# Patient Record
Sex: FEMALE | Race: WHITE | Employment: UNEMPLOYED | URBAN - METROPOLITAN AREA
[De-identification: names, ages, dates, MRNs, and addresses within clinical notes are randomized per-mention and may not be internally consistent; named-entity substitution may affect disease eponyms.]

---

## 2022-03-19 PROBLEM — G93.41 ACUTE METABOLIC ENCEPHALOPATHY: Status: ACTIVE | Noted: 2017-04-08

## 2022-03-19 PROBLEM — E46 MALNUTRITION (HCC): Status: ACTIVE | Noted: 2017-04-08

## 2022-03-19 PROBLEM — R94.31 PROLONGED Q-T INTERVAL ON ECG: Status: ACTIVE | Noted: 2017-04-08

## 2022-03-20 PROBLEM — E83.52 HYPERCALCEMIA: Status: ACTIVE | Noted: 2017-04-08

## 2022-03-20 PROBLEM — E87.6 HYPOKALEMIA: Status: ACTIVE | Noted: 2017-04-08

## 2023-10-03 PROBLEM — J44.9 CHRONIC OBSTRUCTIVE LUNG DISEASE (HCC): Status: ACTIVE | Noted: 2017-04-08

## 2023-10-03 PROBLEM — F32.A DEPRESSIVE DISORDER: Status: ACTIVE | Noted: 2017-04-08

## 2023-10-03 PROBLEM — E87.6 HYPOKALEMIA: Status: RESOLVED | Noted: 2017-04-08 | Resolved: 2023-10-03

## 2023-10-03 PROBLEM — G93.41 ACUTE METABOLIC ENCEPHALOPATHY: Status: RESOLVED | Noted: 2017-04-08 | Resolved: 2023-10-03

## 2023-10-03 PROBLEM — R59.0 MEDIASTINAL LYMPHADENOPATHY: Status: ACTIVE | Noted: 2017-04-01

## 2023-10-03 PROBLEM — E83.52 HYPERCALCEMIA: Status: RESOLVED | Noted: 2017-04-08 | Resolved: 2023-10-03

## 2023-10-03 PROBLEM — I10 ESSENTIAL HYPERTENSION: Status: ACTIVE | Noted: 2017-04-08

## 2023-10-04 PROBLEM — I16.0 HYPERTENSIVE URGENCY: Status: ACTIVE | Noted: 2023-10-04

## 2023-10-04 PROBLEM — F23 ACUTE PSYCHOSIS (HCC): Status: ACTIVE | Noted: 2023-10-04

## 2023-10-04 PROBLEM — F22 DELUSIONS (HCC): Status: ACTIVE | Noted: 2023-10-04

## 2023-10-04 PROBLEM — R45.1 ANXIETY WITH AGITATION: Status: ACTIVE | Noted: 2023-10-04

## 2023-10-04 PROBLEM — R45.851 SUICIDAL IDEATION: Status: ACTIVE | Noted: 2023-10-04

## 2023-10-04 PROBLEM — F41.9 ANXIETY WITH AGITATION: Status: ACTIVE | Noted: 2023-10-04

## 2023-10-04 PROBLEM — N18.32 STAGE 3B CHRONIC KIDNEY DISEASE (HCC): Status: ACTIVE | Noted: 2023-10-04

## 2023-10-04 PROBLEM — Z91.89 POTENTIAL FOR COGNITIVE IMPAIRMENT: Status: ACTIVE | Noted: 2023-10-04

## 2023-10-04 PROBLEM — F22 PARANOIA (HCC): Status: ACTIVE | Noted: 2023-10-04

## 2023-10-04 PROBLEM — R45.850 HOMICIDAL IDEATION: Status: ACTIVE | Noted: 2023-10-04

## 2023-10-04 PROBLEM — R41.0 CONFUSION: Status: ACTIVE | Noted: 2023-10-04

## 2024-10-02 ENCOUNTER — HOSPITAL ENCOUNTER (EMERGENCY)
Facility: HOSPITAL | Age: 74
Discharge: NURSING, REHAB OR LONG-TERM CARE | End: 2024-10-07
Attending: STUDENT IN AN ORGANIZED HEALTH CARE EDUCATION/TRAINING PROGRAM
Payer: MEDICARE

## 2024-10-02 DIAGNOSIS — N30.00 ACUTE CYSTITIS WITHOUT HEMATURIA: ICD-10-CM

## 2024-10-02 DIAGNOSIS — R45.851 SUICIDAL IDEATION: Primary | ICD-10-CM

## 2024-10-02 LAB
ALBUMIN SERPL-MCNC: 3.5 G/DL (ref 3.5–5)
ALBUMIN/GLOB SERPL: 0.9 (ref 1.1–2.2)
ALP SERPL-CCNC: 112 U/L (ref 45–117)
ALT SERPL-CCNC: 18 U/L (ref 12–78)
AMPHET UR QL SCN: NEGATIVE
ANION GAP SERPL CALC-SCNC: 10 MMOL/L (ref 2–12)
APPEARANCE UR: CLEAR
AST SERPL W P-5'-P-CCNC: 18 U/L (ref 15–37)
BACTERIA URNS QL MICRO: ABNORMAL /HPF
BARBITURATES UR QL SCN: NEGATIVE
BASOPHILS # BLD: 0.1 K/UL (ref 0–0.1)
BASOPHILS NFR BLD: 1 % (ref 0–1)
BENZODIAZ UR QL: NEGATIVE
BILIRUB SERPL-MCNC: 0.2 MG/DL (ref 0.2–1)
BILIRUB UR QL: NEGATIVE
BUN SERPL-MCNC: 52 MG/DL (ref 6–20)
BUN/CREAT SERPL: 23 (ref 12–20)
CA-I BLD-MCNC: 9.4 MG/DL (ref 8.5–10.1)
CANNABINOIDS UR QL SCN: NEGATIVE
CHLORIDE SERPL-SCNC: 102 MMOL/L (ref 97–108)
CO2 SERPL-SCNC: 25 MMOL/L (ref 21–32)
COCAINE UR QL SCN: NEGATIVE
COLOR UR: ABNORMAL
CREAT SERPL-MCNC: 2.26 MG/DL (ref 0.55–1.02)
DIFFERENTIAL METHOD BLD: ABNORMAL
EOSINOPHIL # BLD: 0.1 K/UL (ref 0–0.4)
EOSINOPHIL NFR BLD: 2 % (ref 0–7)
EPITH CASTS URNS QL MICRO: ABNORMAL /LPF
ERYTHROCYTE [DISTWIDTH] IN BLOOD BY AUTOMATED COUNT: 13.9 % (ref 11.5–14.5)
ETHANOL SERPL-MCNC: <10 MG/DL (ref 0–0.08)
GLOBULIN SER CALC-MCNC: 4 G/DL (ref 2–4)
GLUCOSE SERPL-MCNC: 121 MG/DL (ref 65–100)
GLUCOSE UR STRIP.AUTO-MCNC: NEGATIVE MG/DL
HCT VFR BLD AUTO: 37.9 % (ref 35–47)
HGB BLD-MCNC: 12.5 G/DL (ref 11.5–16)
HGB UR QL STRIP: NEGATIVE
IMM GRANULOCYTES # BLD AUTO: 0 K/UL (ref 0–0.04)
IMM GRANULOCYTES NFR BLD AUTO: 1 % (ref 0–0.5)
KETONES UR QL STRIP.AUTO: NEGATIVE MG/DL
LEUKOCYTE ESTERASE UR QL STRIP.AUTO: ABNORMAL
LYMPHOCYTES # BLD: 2 K/UL (ref 0.8–3.5)
LYMPHOCYTES NFR BLD: 32 % (ref 12–49)
Lab: NORMAL
MCH RBC QN AUTO: 31.4 PG (ref 26–34)
MCHC RBC AUTO-ENTMCNC: 33 G/DL (ref 30–36.5)
MCV RBC AUTO: 95.2 FL (ref 80–99)
METHADONE UR QL: NEGATIVE
MONOCYTES # BLD: 0.7 K/UL (ref 0–1)
MONOCYTES NFR BLD: 12 % (ref 5–13)
NEUTS SEG # BLD: 3.2 K/UL (ref 1.8–8)
NEUTS SEG NFR BLD: 52 % (ref 32–75)
NITRITE UR QL STRIP.AUTO: NEGATIVE
NRBC # BLD: 0 K/UL (ref 0–0.01)
NRBC BLD-RTO: 0 PER 100 WBC
OPIATES UR QL: NEGATIVE
PCP UR QL: NEGATIVE
PH UR STRIP: 6 (ref 5–8)
PLATELET # BLD AUTO: 270 K/UL (ref 150–400)
PMV BLD AUTO: 11.8 FL (ref 8.9–12.9)
POTASSIUM SERPL-SCNC: 4.2 MMOL/L (ref 3.5–5.1)
PROT SERPL-MCNC: 7.5 G/DL (ref 6.4–8.2)
PROT UR STRIP-MCNC: NEGATIVE MG/DL
RBC # BLD AUTO: 3.98 M/UL (ref 3.8–5.2)
RBC #/AREA URNS HPF: ABNORMAL /HPF (ref 0–5)
SODIUM SERPL-SCNC: 137 MMOL/L (ref 136–145)
SP GR UR REFRACTOMETRY: 1.01 (ref 1–1.03)
URINE CULTURE IF INDICATED: ABNORMAL
UROBILINOGEN UR QL STRIP.AUTO: 0.1 EU/DL (ref 0.1–1)
WBC # BLD AUTO: 6.2 K/UL (ref 3.6–11)
WBC URNS QL MICRO: ABNORMAL /HPF (ref 0–4)

## 2024-10-02 PROCEDURE — 80307 DRUG TEST PRSMV CHEM ANLYZR: CPT

## 2024-10-02 PROCEDURE — 87088 URINE BACTERIA CULTURE: CPT

## 2024-10-02 PROCEDURE — 81001 URINALYSIS AUTO W/SCOPE: CPT

## 2024-10-02 PROCEDURE — 6370000000 HC RX 637 (ALT 250 FOR IP): Performed by: STUDENT IN AN ORGANIZED HEALTH CARE EDUCATION/TRAINING PROGRAM

## 2024-10-02 PROCEDURE — 80053 COMPREHEN METABOLIC PANEL: CPT

## 2024-10-02 PROCEDURE — 87086 URINE CULTURE/COLONY COUNT: CPT

## 2024-10-02 PROCEDURE — 87186 SC STD MICRODIL/AGAR DIL: CPT

## 2024-10-02 PROCEDURE — 99284 EMERGENCY DEPT VISIT MOD MDM: CPT

## 2024-10-02 PROCEDURE — 85025 COMPLETE CBC W/AUTO DIFF WBC: CPT

## 2024-10-02 PROCEDURE — 82077 ASSAY SPEC XCP UR&BREATH IA: CPT

## 2024-10-02 RX ORDER — METOPROLOL TARTRATE 25 MG/1
25 TABLET, FILM COATED ORAL DAILY
Status: DISCONTINUED | OUTPATIENT
Start: 2024-10-03 | End: 2024-10-03

## 2024-10-02 RX ORDER — AMLODIPINE BESYLATE 5 MG/1
5 TABLET ORAL DAILY
Status: DISCONTINUED | OUTPATIENT
Start: 2024-10-03 | End: 2024-10-07 | Stop reason: HOSPADM

## 2024-10-02 RX ORDER — DONEPEZIL HYDROCHLORIDE 5 MG/1
5 TABLET, FILM COATED ORAL NIGHTLY
Status: DISCONTINUED | OUTPATIENT
Start: 2024-10-03 | End: 2024-10-07 | Stop reason: HOSPADM

## 2024-10-02 RX ORDER — HYDROXYZINE HYDROCHLORIDE 25 MG/1
25 TABLET, FILM COATED ORAL DAILY
Status: DISCONTINUED | OUTPATIENT
Start: 2024-10-02 | End: 2024-10-07 | Stop reason: HOSPADM

## 2024-10-02 RX ORDER — ATORVASTATIN CALCIUM 10 MG/1
5 TABLET, FILM COATED ORAL NIGHTLY
Status: DISCONTINUED | OUTPATIENT
Start: 2024-10-02 | End: 2024-10-07 | Stop reason: HOSPADM

## 2024-10-02 RX ORDER — OLANZAPINE 2.5 MG/1
2.5 TABLET, FILM COATED ORAL NIGHTLY
Status: DISCONTINUED | OUTPATIENT
Start: 2024-10-03 | End: 2024-10-07 | Stop reason: HOSPADM

## 2024-10-02 RX ORDER — CIPROFLOXACIN 500 MG/1
250 TABLET, FILM COATED ORAL EVERY 12 HOURS SCHEDULED
Status: DISCONTINUED | OUTPATIENT
Start: 2024-10-02 | End: 2024-10-02

## 2024-10-02 RX ORDER — CIPROFLOXACIN 500 MG/1
500 TABLET, FILM COATED ORAL EVERY 24 HOURS
Status: DISCONTINUED | OUTPATIENT
Start: 2024-10-02 | End: 2024-10-07 | Stop reason: HOSPADM

## 2024-10-02 RX ADMIN — CIPROFLOXACIN HYDROCHLORIDE 500 MG: 500 TABLET, FILM COATED ORAL at 21:42

## 2024-10-02 ASSESSMENT — LIFESTYLE VARIABLES
HOW MANY STANDARD DRINKS CONTAINING ALCOHOL DO YOU HAVE ON A TYPICAL DAY: PATIENT DOES NOT DRINK
HOW OFTEN DO YOU HAVE A DRINK CONTAINING ALCOHOL: NEVER

## 2024-10-02 ASSESSMENT — PAIN - FUNCTIONAL ASSESSMENT: PAIN_FUNCTIONAL_ASSESSMENT: NONE - DENIES PAIN

## 2024-10-02 NOTE — ED TRIAGE NOTES
Per ems, pt reported that she was going to harm herself to a provider. Pt presents to ED with  on paperless ECO.

## 2024-10-02 NOTE — BSMART NOTE
Ines notified writer that pt does not have capacity and she will wait to give her disposition after she is medically cleared.

## 2024-10-03 LAB
FLUAV RNA SPEC QL NAA+PROBE: NOT DETECTED
FLUBV RNA SPEC QL NAA+PROBE: NOT DETECTED
SARS-COV-2 RNA RESP QL NAA+PROBE: NOT DETECTED

## 2024-10-03 PROCEDURE — 6370000000 HC RX 637 (ALT 250 FOR IP): Performed by: STUDENT IN AN ORGANIZED HEALTH CARE EDUCATION/TRAINING PROGRAM

## 2024-10-03 PROCEDURE — 87636 SARSCOV2 & INF A&B AMP PRB: CPT

## 2024-10-03 PROCEDURE — 6370000000 HC RX 637 (ALT 250 FOR IP): Performed by: PSYCHIATRY & NEUROLOGY

## 2024-10-03 RX ORDER — SERTRALINE HYDROCHLORIDE 25 MG/1
50 TABLET, FILM COATED ORAL DAILY
Status: DISCONTINUED | OUTPATIENT
Start: 2024-10-03 | End: 2024-10-03

## 2024-10-03 RX ORDER — OLANZAPINE 5 MG/1
5 TABLET ORAL NIGHTLY
COMMUNITY

## 2024-10-03 RX ORDER — OLANZAPINE 2.5 MG/1
2.5 TABLET, FILM COATED ORAL NIGHTLY
COMMUNITY
End: 2024-10-03

## 2024-10-03 RX ORDER — ONDANSETRON 4 MG/1
4 TABLET, FILM COATED ORAL EVERY 4 HOURS PRN
COMMUNITY

## 2024-10-03 RX ORDER — DIVALPROEX SODIUM 250 MG/1
250 TABLET, DELAYED RELEASE ORAL NIGHTLY
COMMUNITY

## 2024-10-03 RX ORDER — DONEPEZIL HYDROCHLORIDE 5 MG/1
5 TABLET, FILM COATED ORAL NIGHTLY
COMMUNITY

## 2024-10-03 RX ORDER — AMANTADINE HYDROCHLORIDE 50 MG/5ML
100 SOLUTION ORAL EVERY OTHER DAY
Status: DISCONTINUED | OUTPATIENT
Start: 2024-10-03 | End: 2024-10-06

## 2024-10-03 RX ORDER — ONDANSETRON 4 MG/1
4 TABLET, ORALLY DISINTEGRATING ORAL EVERY 8 HOURS PRN
Status: DISCONTINUED | OUTPATIENT
Start: 2024-10-03 | End: 2024-10-07 | Stop reason: HOSPADM

## 2024-10-03 RX ORDER — DIVALPROEX SODIUM 125 MG/1
125 CAPSULE, COATED PELLETS ORAL EVERY 12 HOURS SCHEDULED
Status: DISCONTINUED | OUTPATIENT
Start: 2024-10-03 | End: 2024-10-06

## 2024-10-03 RX ORDER — ACETAMINOPHEN 325 MG/1
650 TABLET ORAL EVERY 4 HOURS PRN
COMMUNITY

## 2024-10-03 RX ORDER — LOSARTAN POTASSIUM 50 MG/1
50 TABLET ORAL DAILY
COMMUNITY

## 2024-10-03 RX ORDER — METOPROLOL SUCCINATE 100 MG/1
100 TABLET, EXTENDED RELEASE ORAL DAILY
COMMUNITY

## 2024-10-03 RX ORDER — METOPROLOL SUCCINATE 50 MG/1
100 TABLET, EXTENDED RELEASE ORAL DAILY
Status: DISCONTINUED | OUTPATIENT
Start: 2024-10-03 | End: 2024-10-07 | Stop reason: HOSPADM

## 2024-10-03 RX ORDER — ACETAMINOPHEN 325 MG/1
650 TABLET ORAL EVERY 6 HOURS PRN
Status: DISCONTINUED | OUTPATIENT
Start: 2024-10-03 | End: 2024-10-07 | Stop reason: HOSPADM

## 2024-10-03 RX ORDER — TRAZODONE HYDROCHLORIDE 50 MG/1
50 TABLET, FILM COATED ORAL NIGHTLY
Status: DISCONTINUED | OUTPATIENT
Start: 2024-10-03 | End: 2024-10-07 | Stop reason: HOSPADM

## 2024-10-03 RX ADMIN — HYDROXYZINE HYDROCHLORIDE 25 MG: 25 TABLET, FILM COATED ORAL at 09:58

## 2024-10-03 RX ADMIN — OLANZAPINE 2.5 MG: 2.5 TABLET, FILM COATED ORAL at 01:12

## 2024-10-03 RX ADMIN — ACETAMINOPHEN 650 MG: 325 TABLET ORAL at 01:11

## 2024-10-03 RX ADMIN — TRAZODONE HYDROCHLORIDE 50 MG: 50 TABLET ORAL at 22:12

## 2024-10-03 RX ADMIN — HYDROXYZINE HYDROCHLORIDE 25 MG: 25 TABLET, FILM COATED ORAL at 01:12

## 2024-10-03 RX ADMIN — METOPROLOL SUCCINATE 100 MG: 50 TABLET, EXTENDED RELEASE ORAL at 09:58

## 2024-10-03 RX ADMIN — SERTRALINE 50 MG: 25 TABLET, FILM COATED ORAL at 09:58

## 2024-10-03 RX ADMIN — ATORVASTATIN CALCIUM 5 MG: 10 TABLET, FILM COATED ORAL at 22:11

## 2024-10-03 RX ADMIN — DONEPEZIL HYDROCHLORIDE 5 MG: 5 TABLET, FILM COATED ORAL at 01:12

## 2024-10-03 RX ADMIN — ATORVASTATIN CALCIUM 5 MG: 10 TABLET, FILM COATED ORAL at 01:12

## 2024-10-03 RX ADMIN — DONEPEZIL HYDROCHLORIDE 5 MG: 5 TABLET, FILM COATED ORAL at 22:13

## 2024-10-03 RX ADMIN — CIPROFLOXACIN HYDROCHLORIDE 500 MG: 500 TABLET, FILM COATED ORAL at 22:12

## 2024-10-03 RX ADMIN — DIVALPROEX SODIUM 125 MG: 125 CAPSULE, COATED PELLETS ORAL at 22:16

## 2024-10-03 RX ADMIN — OLANZAPINE 2.5 MG: 2.5 TABLET, FILM COATED ORAL at 22:13

## 2024-10-03 RX ADMIN — TRAZODONE HYDROCHLORIDE 50 MG: 50 TABLET ORAL at 01:11

## 2024-10-03 RX ADMIN — AMLODIPINE BESYLATE 5 MG: 5 TABLET ORAL at 09:58

## 2024-10-03 RX ADMIN — FLUOXETINE HYDROCHLORIDE 60 MG: 20 CAPSULE ORAL at 09:58

## 2024-10-03 ASSESSMENT — PAIN SCALES - GENERAL
PAINLEVEL_OUTOF10: 0
PAINLEVEL_OUTOF10: 0

## 2024-10-03 ASSESSMENT — PAIN - FUNCTIONAL ASSESSMENT: PAIN_FUNCTIONAL_ASSESSMENT: NONE - DENIES PAIN

## 2024-10-03 NOTE — BSMART NOTE
This writer rounded on patient.  Patient agreed to talk with this writer and was informed of counselor's role.    The patient's appearance shows no evidence of impairment.  The patient's behavior is agitated and is guarded. The patient is disoriented to time, place, and situation.  The patient's speech is loud.  The patient's mood  is anxious and is irritable.  The range of affect shows no evidence of impairment.  The patient's thought content  demonstrates delusions.  The thought process is circumstantial.  The patient's perception demonstrated changes in the following:  none. The patient's memory is impaired.  The patient's appetite is decreased.  The patient's sleep shows no evidence of impairment. The patient's insight is blaming and The patient shows no insight.  The patient's judgement is psychologically impaired and is cognitively impaired.  Patient states she suicidal due to she is unable to go home.  Pt states that she needs to get home as she needs to feed her dogs and her horses.  States her mom and dad must be worried about her.  Pt denies homicidal ideation.   The plan is D 19 continues to bed search.  Pt's hearing is Friday Oct 4, 2024.

## 2024-10-03 NOTE — ED PROVIDER NOTES
[facility] EMERGENCY DEPT  EMERGENCY DEPARTMENT HISTORY AND PHYSICAL EXAM      Date: 10/2/2024  Patient Name: Rosario Hunt  MRN: [id number]  Birthdate 1950  Date of evaluation: 10/2/2024  Provider: Leander Coleman MD   Note Started: 8:01 PM EDT 10/2/24    HISTORY OF PRESENT ILLNESS     Chief Complaint   Patient presents with    Mental Health Problem       History Provided By: Patient, nursing staff    HPI: Rosario Hunt is a 74 y.o. female with past medical history as reviewed below presents for evaluation of suicidal ideation and agitation.  Patient arrives under E CO after stating that she was going to harm herself, patient denies any plan for suicide.  She is agitated, making history limited    PAST MEDICAL HISTORY   Past Medical History:  Past Medical History:   Diagnosis Date    Acute metabolic encephalopathy 04/08/2017    Acute renal insufficiency 11/10/2016    ADHD     Arthritis     Asthma     Chronic kidney disease     Chronic obstructive lung disease (HCC) 04/08/2017    Depressive disorder 04/08/2017    Endocrine disease     Essential hypertension 04/08/2017    Hypercalcemia 04/08/2017    unknown origin.  S/p bisphosphonate at the hospital on 04/10/17 and now has recurrent hypercalcemia. S/p Zometa on 5/1/19, 10/7/20, 2/3/21, 5/12/21, 5/12/22.    Hypertension     Hypokalemia 04/08/2017    Malnutrition ([facility])     Mediastinal lymphadenopathy 04/01/2017    Metabolic encephalopathy 04/08/2017    Altered mental status due to metabolic encephalopathy    Osteopenia     supposed to be on Zometa.    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

## 2024-10-03 NOTE — ED NOTES
1900- Assumed care of pt. Room psych safe, Police and 1:1 sitter in room.     1937- Pt agreeing to labs at this time, blood work sent to lab. Still awaiting medical clearance pending UA.     2030- Pt states she is no longer having SI, stating she wants to go home. BSMART aware    2045- Ambulated to bathroom with officer and sitter, calm and cooperative providing urine sample.     0110- Pt cooperative with medication administration

## 2024-10-03 NOTE — BSMART NOTE
Adult Attempted to reassess pt.  Pt asleep.   at the door.  D 19, Soumya, states that she has placed pt on the wait list at [facility] and she is 3rd on the list.  Soumya states that facilities have declined due to Dementia exclusion and 2 reported at capacity.     Patient Needs Assistance to Leave Residence...

## 2024-10-03 NOTE — ED NOTES
History updated in chart from paperwork/prior hospital encounters.   Dementia,alzheimer's and frontal lobe disorder added.    Home meds (alzheimer meds and others) updated in chart as well as they were inaccurate.     [facility] re[facility] called, states they have no memory care unit, no locked units, and patient's wing has no safety/locking capabilities and believe that is what she needs.     D19 and BH intake, Francine informed of patient's history/amara's statements.

## 2024-10-04 ENCOUNTER — APPOINTMENT (OUTPATIENT)
Facility: HOSPITAL | Age: 74
End: 2024-10-04
Payer: MEDICARE

## 2024-10-04 PROCEDURE — 6370000000 HC RX 637 (ALT 250 FOR IP): Performed by: STUDENT IN AN ORGANIZED HEALTH CARE EDUCATION/TRAINING PROGRAM

## 2024-10-04 PROCEDURE — 93005 ELECTROCARDIOGRAM TRACING: CPT | Performed by: EMERGENCY MEDICINE

## 2024-10-04 PROCEDURE — 96372 THER/PROPH/DIAG INJ SC/IM: CPT

## 2024-10-04 PROCEDURE — 6360000002 HC RX W HCPCS

## 2024-10-04 PROCEDURE — 6370000000 HC RX 637 (ALT 250 FOR IP): Performed by: PSYCHIATRY & NEUROLOGY

## 2024-10-04 RX ORDER — LORAZEPAM 2 MG/ML
INJECTION INTRAMUSCULAR
Status: DISCONTINUED
Start: 2024-10-04 | End: 2024-10-04

## 2024-10-04 RX ORDER — LORAZEPAM 2 MG/ML
0.5 INJECTION INTRAMUSCULAR ONCE
Status: DISCONTINUED | OUTPATIENT
Start: 2024-10-04 | End: 2024-10-04

## 2024-10-04 RX ORDER — QUETIAPINE FUMARATE 25 MG/1
12.5 TABLET, FILM COATED ORAL NIGHTLY
Status: DISCONTINUED | OUTPATIENT
Start: 2024-10-04 | End: 2024-10-06

## 2024-10-04 RX ORDER — LORAZEPAM 2 MG/ML
0.5 INJECTION INTRAMUSCULAR EVERY 6 HOURS PRN
Status: DISCONTINUED | OUTPATIENT
Start: 2024-10-04 | End: 2024-10-05

## 2024-10-04 RX ADMIN — ACETAMINOPHEN 650 MG: 325 TABLET ORAL at 09:52

## 2024-10-04 RX ADMIN — LORAZEPAM 0.5 MG: 2 INJECTION INTRAMUSCULAR at 14:16

## 2024-10-04 RX ADMIN — LORAZEPAM 0.5 MG: 2 INJECTION INTRAMUSCULAR; INTRAVENOUS at 14:16

## 2024-10-04 RX ADMIN — HYDROXYZINE HYDROCHLORIDE 25 MG: 25 TABLET, FILM COATED ORAL at 10:00

## 2024-10-04 RX ADMIN — AMLODIPINE BESYLATE 5 MG: 5 TABLET ORAL at 09:56

## 2024-10-04 NOTE — ED NOTES
Bedside report given to JUSTIN Rodriguez    D19 requesting EKG, CT head, Urine culture, and complete med rec for possible placement at [facility] for pt. Oncoming RN notified.

## 2024-10-04 NOTE — ED NOTES
Verbal report received from JUSTIN Enriquez. Pt in [facility], room psych safed, 1:1 at bedside. Scarlet Vieyrauty at bedside, TDO hearing reportedly to be sometime this am per [place].

## 2024-10-04 NOTE — ED NOTES
Pt refusing any further assessment and yelling at staff. Multiple attempts at redirecting failed. Tra SNOW informed. 1 time dose ativan 0.5mg for Pt aggressive behavior and anxiety prior to CT. Will attempt to get CT after Pt aggressive behavior subsides.

## 2024-10-04 NOTE — BSMART NOTE
EKG, medication list, allergies and preliminary urine culture results faxed to CONSTAN[facility] Chacon.

## 2024-10-04 NOTE — BH NOTE
- Discussed viral upper respiratory infection diagnosis with patient and/or caregiver.  - Discussed course of illness   - Discussed use of  tylenol or motrin as needed for fever and discomfort.  - Symptomatic management such as rest and increased fluid intake advised; may use cool-mist humidifier, vapo-rub on chest, and nasal spray to aid with congestion.   - Return to clinic if condition persist or worsens.  - Call [facility] as needed for any questions or concerns.     HEARING DISPOSITION    Special Justice: Judge Camarillo  : Mr. Michelle   Committed: 15 Days to a CSB Approved Facility   Expires: 10/18/2024     Transport: [facility]

## 2024-10-04 NOTE — BSMART NOTE
BSMART Liaison Team Note     LOS:  0     Patient goal(s) for today:  take medications as prescribed, make needs known in an appropriate manner, engage in supportive counseling as needed.     BSMART Liaison team focus/goals: assess needs, provide support and education, coordinate care, provided supportive counseling as needed.    Progress note: Pt did not agree nor disagree to meet with Liaison. Liaison received pt laying in bed with  officer at bed side. Pt presented jittery, somewhat irritable and lacking insight to situation. Pt stated she did not come from a nursing jayda, she lives alone. Pt declined every making statement of wanting to harm herself or anyone else. Pt reported she is only here to get help or medication for sleep issues. Pt stated she did not need to stay for additional treatment. Pt denies SI/HI/AVH, symptoms of depression and anxiety. Sleep is irregular while appetite is normal. Pt requesting breakfast at this time, liaison informed her breakfast should be by shortly and referred her to snacks beside the bed.     Barriers to Discharge: TDO Hearing Today  Guns in the home: No     Outpatient provider(s):  unknown  Insurance info/prescription coverage:  Payor: MEDICARE /  /  /      Diagnosis:   Past Medical History:   Diagnosis Date    Acute metabolic encephalopathy 04/08/2017    Acute renal insufficiency 11/10/2016    ADHD     Arthritis     Asthma     Chronic kidney disease     Chronic obstructive lung disease (HCC) 04/08/2017    Depressive disorder 04/08/2017    Endocrine disease     Essential hypertension 04/08/2017    Hypercalcemia 04/08/2017    unknown origin.  S/p bisphosphonate at the hospital on 04/10/17 and now has recurrent hypercalcemia. S/p Zometa on 5/1/19, 10/7/20, 2/3/21, 5/12/21, 5/12/22.    Hypertension     Hypokalemia 04/08/2017    Malnutrition (HCC)     Mediastinal lymphadenopathy 04/01/2017    Metabolic encephalopathy 04/08/2017    Altered mental status due to metabolic

## 2024-10-04 NOTE — BSMART NOTE
This writer rounded on patient.  Patient agreed to talk with this writer and was informed of counselor's role.    The patient's appearance is unkempt.  The patient's behavior is guarded. The patient is disoriented.  The patient's speech shows no evidence of impairment.  The patient's mood  is euthymic.  The range of affect shows no evidence of impairment.  The patient's thought content  demonstrates delusions.  The thought process is circumstantial.  The patient's perception shows no evidence of impairment. The patient's memory is impaired.  The patient's appetite shows no evidence of impairment.  The patient's sleep shows no evidence of impairment. The patient shows little insight.  The patient's judgement is psychologically impaired and is cognitively impaired.  Patient denied suicidal and/or homicidal ideation.   The plan is martin D19 to continue bed search.

## 2024-10-04 NOTE — ED NOTES
Bedside shift change report given to Fide RN (oncoming nurse) by Michael RN (offgoing nurse). Report included the following information Nurse Handoff Report, ED Encounter Summary, ED SBAR, Adult Overview, and Intake/Output, Pt refusing further assessment and labs.

## 2024-10-04 NOTE — BSMART NOTE
Reaction Severity Reaction Type Noted          Allergies        Erythromycin Swelling High  11/10/2016          Penicillins Swelling High  11/10/2016          Tetracycline Swelling High  11/10/2016          Prednisone Other (See Comments) Not Specified  9/2/2020   psychosis

## 2024-10-04 NOTE — CONSULTS
[address] [address] [place]  [address]                              CONSULTATION      PATIENT NAME: ATUL WATSON                : 1950  MED REC NO: [id number]                       ROOM: ER24  ACCOUNT NO: [id number]                       ADMIT DATE: 10/02/2024  PROVIDER: Sergio Dutta MD    DATE OF SERVICE:  10/03/2024    ATTENDING PHYSICIAN:  YISEL MCFARLAND    REASON FOR CONSULT:  Mental health ordered by the attending. Saw the patient.  Chart reviewed.  Pre-screening from the local CSB reviewed.    HISTORY OF PRESENT ILLNESS:  This is a 74-year-old  female patient, admitted to ED department, brought by EMS, reported that she was going to harm herself per provider,  The patient The patient present to ED with .  Subsequently   by D19 and TDOs.   Apparently, the patient at [facility], diagnosed as dementia, reportedly threatening to harm herself, threatening to harm others.  When I saw the patient, the patient is resting, woke up promptly, polite, cooperative, thanks for coming to see her, acknowledged having depression and suicidal thoughts on and off, did not endorse any thought to harm others.  She has some perplexity and confusion.  She knew her date of birth, but she thought she was 28 years old one time, later one time thought she was 68 years old.  She says she works as a .  She continued to work as a  even now.  She has a flat affect, polite to me, does not appear to be having any internal stimuli, but definitely delusional and some forgetfulness, information not correct.  She is also confabulating.  She was diagnosed as dementia.    OUTSIDE MEDICATIONS:  Listed as methylprednisolone 4 mg Dosepak, neomycin, polymyxin, dexamethasone, amlodipine 5 mg daily, donepezil 5 mg daily, Prozac 60 mg daily, guaifenesin 12-hour tablet 1200 twice a day, hydroxyzine pamoate 25 mg b.i.d., metoprolol 25 mg

## 2024-10-05 LAB
BACTERIA SPEC CULT: ABNORMAL
COLONY COUNT, CNT: ABNORMAL
EKG ATRIAL RATE: 70 BPM
EKG DIAGNOSIS: NORMAL
EKG P AXIS: 54 DEGREES
EKG P-R INTERVAL: 166 MS
EKG Q-T INTERVAL: 460 MS
EKG QRS DURATION: 124 MS
EKG QTC CALCULATION (BAZETT): 496 MS
EKG R AXIS: -61 DEGREES
EKG T AXIS: 31 DEGREES
EKG VENTRICULAR RATE: 70 BPM
Lab: ABNORMAL

## 2024-10-05 PROCEDURE — 6360000002 HC RX W HCPCS: Performed by: PSYCHIATRY & NEUROLOGY

## 2024-10-05 PROCEDURE — 6370000000 HC RX 637 (ALT 250 FOR IP): Performed by: STUDENT IN AN ORGANIZED HEALTH CARE EDUCATION/TRAINING PROGRAM

## 2024-10-05 PROCEDURE — 2580000003 HC RX 258: Performed by: PSYCHIATRY & NEUROLOGY

## 2024-10-05 PROCEDURE — 6370000000 HC RX 637 (ALT 250 FOR IP): Performed by: PSYCHIATRY & NEUROLOGY

## 2024-10-05 RX ORDER — ZIPRASIDONE MESYLATE 20 MG/ML
20 INJECTION, POWDER, LYOPHILIZED, FOR SOLUTION INTRAMUSCULAR ONCE
Status: DISCONTINUED | OUTPATIENT
Start: 2024-10-05 | End: 2024-10-07 | Stop reason: HOSPADM

## 2024-10-05 RX ADMIN — ACETAMINOPHEN 650 MG: 325 TABLET ORAL at 09:56

## 2024-10-05 RX ADMIN — METOPROLOL SUCCINATE 100 MG: 50 TABLET, EXTENDED RELEASE ORAL at 20:37

## 2024-10-05 RX ADMIN — AMLODIPINE BESYLATE 5 MG: 5 TABLET ORAL at 20:36

## 2024-10-05 RX ADMIN — OLANZAPINE 2.5 MG: 10 INJECTION, POWDER, LYOPHILIZED, FOR SOLUTION INTRAMUSCULAR at 13:57

## 2024-10-05 RX ADMIN — QUETIAPINE FUMARATE 12.5 MG: 25 TABLET ORAL at 20:24

## 2024-10-05 RX ADMIN — ATORVASTATIN CALCIUM 5 MG: 10 TABLET, FILM COATED ORAL at 20:28

## 2024-10-05 RX ADMIN — TRAZODONE HYDROCHLORIDE 50 MG: 50 TABLET ORAL at 20:23

## 2024-10-05 RX ADMIN — DONEPEZIL HYDROCHLORIDE 5 MG: 5 TABLET, FILM COATED ORAL at 20:27

## 2024-10-05 RX ADMIN — OLANZAPINE 2.5 MG: 2.5 TABLET, FILM COATED ORAL at 20:25

## 2024-10-05 RX ADMIN — DIVALPROEX SODIUM 125 MG: 125 CAPSULE, COATED PELLETS ORAL at 20:26

## 2024-10-05 RX ADMIN — CIPROFLOXACIN HYDROCHLORIDE 500 MG: 500 TABLET, FILM COATED ORAL at 20:28

## 2024-10-05 ASSESSMENT — PAIN SCALES - GENERAL
PAINLEVEL_OUTOF10: 0
PAINLEVEL_OUTOF10: 8

## 2024-10-05 ASSESSMENT — PAIN DESCRIPTION - LOCATION: LOCATION: HEAD

## 2024-10-05 ASSESSMENT — PAIN DESCRIPTION - DESCRIPTORS: DESCRIPTORS: ACHING

## 2024-10-05 NOTE — BH NOTE
Progress note for October 4, 2024 patient seen for follow-up chart reviewed spoke with the Nena  [facility] looking at possible admission asking for necessary labs including a repeat CT scan of the head patient is alert warble agitated she says she has got at home in the [facility] and she want to be discharged poor insight poor judgment leave a as needed order for Zyprexa if necessary she was committed up to 15 days thank you

## 2024-10-05 NOTE — ED NOTES
Pt in bed. Asleep. No acute distress observed. Respirations even and unlabored. No restraints in use. Sitter present

## 2024-10-05 NOTE — ED NOTES
Pt is stating she wants to leave the hospital. She keeps getting out of bed. She is becoming increasingly hard to redirect.  She keeps stating, \" call my mother and father, they will get me out of here\".

## 2024-10-05 NOTE — ED NOTES
Attempted to draw patient's scheduled labs. Patient uncooperative at this time. Will continue to monitor

## 2024-10-05 NOTE — ED NOTES
Pt is becoming very agitated. She is refusing to stay in the bed. She is at increased risk for falling. She is not listening to reason. She has began raising her voice. She is yelling at staff. Medication to be provided

## 2024-10-05 NOTE — BSMART NOTE
This writer rounded on patient.  Patient did not decline nor agree to talk with this writer and was informed of counselor's role.    The patient's appearance jittery.  The patient's behavior is agitated and is rigid. The patient is not able to be assessed.  The patient's speech shows no evidence of impairment.  The patient's mood  is depressed, is \"very\" anxious, and is irritable.  The range of affect is labile.  The patient's thought content  demonstrates no evidence of impairment.  The thought process shows no evidence of impairment.  The patient's perception shows no evidence of impairment. The patient's memory not able to be assessed.  The patient's appetite shows no evidence of impairment.  The patient's sleep shows no evidence of impairment \"had dreams\" that were \"in between\" good and bad. Insight not assessed.  The patient's judgement is cognitively impaired.  Patient \"not right now, I will be if my food doesn't get warmed up\" suicidal and/or homicidal ideation.   The plan is D19 Stephania Davey stated pt is # 5 on [facility] list, no other places are reviewing her time.

## 2024-10-05 NOTE — ED PROVIDER NOTES
Progress Note on Active Behavioral Health or Case Management Hold    I received signout from Doctor Coleman  on Rosario Hunt and have assumed care while this patient is in our Emergency Department. Please see the below progress notes from the prior providers and myself.    ED Course as of 10/07/24 1658   Wed Oct 02, 2024   1840 MDM: 74-year-old female presents for evaluation of suicidal ideation.  On exam, patient is very agitated, yelling at providers.  Patient arrives under E CO, and given concern for her acting on these thoughts, will have her evaluated by behavioral health services to determine if she meets inpatient crisis stabilization criteria.  Further differential including metabolic encephalopathy versus urinary tract infection.  Will evaluate with CBC, CMP, UDS, UA, ethanol level [BQ]   2030 Creatinine(!): 2.26  Creatinine at baseline [BQ]   2209 Evidence of UTI on UA.  Will treat with antibiotic.  Patient is medically clear [BQ]   Fri Oct 04, 2024   1206 EKG at 1117 normal sinus rhythm rate of 70.  Right bundle branch block.  No ST changes reason rule out dysrhythmia.  Interpreted independently by ER physician. [HP]   1340 Patient is under TDO for her suicidal thoughts.  However she is declining a CT head.  Patient is of sound mind to decline a CT head.  It was only done at the request of the outside facility as a dementia evaluation. [HP]   Sat Oct 05, 2024   1755 Pt received in signout, bedsearch ongoing. Geodon ordered but not given for agitation. [DM]   2127 Received signout.  Bed search in process.  TDO. [HP]   Sun Oct 06, 2024   1833 Received signout.  Bed search in process.  TDO.  Patient is currently being treated for a UTI without evidence of sepsis. [HP]   2314 Patient received in signout, continues to await placement, under TDO, being treated for UTI [BQ]   Mon Oct 07, 2024   0724 Patient received as signout, under TDO, awaiting placement, currently being treated for UTI.  No acute issues at

## 2024-10-05 NOTE — ED NOTES
Pt is mad because she wants to leave. Pt is not understanding that she is committed for psyc treatment

## 2024-10-05 NOTE — ED NOTES
Pt is extremely agitated. She is refusing to stay in bed or stay in her room. She is a high fall risk due to shuffled gait and instability on her feet. She has began yelling at staff, hitting and trying to scratch.   MD Ac made aware. Medications to follow

## 2024-10-05 NOTE — ED NOTES
Brother Marleen Perez called and gave his information  [phone]  Would like to be contacted with updates if possible and pt location in order to provider support
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
## 2024-10-05 NOTE — BSMART NOTE
This writer attempted to round on pt, however pt sleeping at this time. Will attempt later in the morning.

## 2024-10-06 PROCEDURE — 6370000000 HC RX 637 (ALT 250 FOR IP): Performed by: STUDENT IN AN ORGANIZED HEALTH CARE EDUCATION/TRAINING PROGRAM

## 2024-10-06 PROCEDURE — 6370000000 HC RX 637 (ALT 250 FOR IP): Performed by: PSYCHIATRY & NEUROLOGY

## 2024-10-06 RX ORDER — DIVALPROEX SODIUM 125 MG/1
250 CAPSULE, COATED PELLETS ORAL EVERY 12 HOURS SCHEDULED
Status: DISCONTINUED | OUTPATIENT
Start: 2024-10-06 | End: 2024-10-07 | Stop reason: HOSPADM

## 2024-10-06 RX ORDER — QUETIAPINE FUMARATE 25 MG/1
12.5 TABLET, FILM COATED ORAL 2 TIMES DAILY
Status: DISCONTINUED | OUTPATIENT
Start: 2024-10-06 | End: 2024-10-07 | Stop reason: HOSPADM

## 2024-10-06 RX ADMIN — METOPROLOL SUCCINATE 100 MG: 50 TABLET, EXTENDED RELEASE ORAL at 08:33

## 2024-10-06 RX ADMIN — FLUOXETINE HYDROCHLORIDE 60 MG: 20 CAPSULE ORAL at 08:32

## 2024-10-06 RX ADMIN — ACETAMINOPHEN 650 MG: 325 TABLET ORAL at 14:03

## 2024-10-06 RX ADMIN — DIVALPROEX SODIUM 125 MG: 125 CAPSULE, COATED PELLETS ORAL at 08:33

## 2024-10-06 RX ADMIN — AMLODIPINE BESYLATE 5 MG: 5 TABLET ORAL at 08:31

## 2024-10-06 RX ADMIN — HYDROXYZINE HYDROCHLORIDE 25 MG: 25 TABLET, FILM COATED ORAL at 08:33

## 2024-10-06 RX ADMIN — TRAZODONE HYDROCHLORIDE 50 MG: 50 TABLET ORAL at 21:01

## 2024-10-06 RX ADMIN — ATORVASTATIN CALCIUM 5 MG: 10 TABLET, FILM COATED ORAL at 21:01

## 2024-10-06 RX ADMIN — CIPROFLOXACIN HYDROCHLORIDE 500 MG: 500 TABLET, FILM COATED ORAL at 21:01

## 2024-10-06 ASSESSMENT — PAIN SCALES - GENERAL: PAINLEVEL_OUTOF10: 0

## 2024-10-06 NOTE — ED NOTES
Progress Note on Active Behavioral Health or Case Management Hold    I received signout from Doctor Shen  on Rosario Hunt and have assumed care while this patient is in our Emergency Department. Please see the below progress notes from the prior providers and myself.    ED Course as of 10/05/24 2128   Wed Oct 02, 2024   1840 MDM: 74-year-old female presents for evaluation of suicidal ideation.  On exam, patient is very agitated, yelling at providers.  Patient arrives under E CO, and given concern for her acting on these thoughts, will have her evaluated by behavioral health services to determine if she meets inpatient crisis stabilization criteria.  Further differential including metabolic encephalopathy versus urinary tract infection.  Will evaluate with CBC, CMP, UDS, UA, ethanol level [BQ]   2030 Creatinine(!): 2.26  Creatinine at baseline [BQ]   2209 Evidence of UTI on UA.  Will treat with antibiotic.  Patient is medically clear [BQ]   Fri Oct 04, 2024   1206 EKG at 1117 normal sinus rhythm rate of 70.  Right bundle branch block.  No ST changes reason rule out dysrhythmia.  Interpreted independently by ER physician. [HP]   1340 Patient is under TDO for her suicidal thoughts.  However she is declining a CT head.  Patient is of sound mind to decline a CT head.  It was only done at the request of the outside facility as a dementia evaluation. [HP]   Sat Oct 05, 2024   1755 Pt received in signout, bedsearch ongoing. Geodon ordered but not given for agitation. [DM]   2127 Received signout.  Bed search in process.  TDO. [HP]      ED Course User Index  [BQ] Leander Coleman MD  [DM] Tanesha Shen MD  [HP] Catia Ulrich MD       Provider: Catia Ulrich MD   Note Started: 9:28 PM EDT 10/5/24       Catia Ulrich MD  10/05/24 2128

## 2024-10-06 NOTE — BSMART NOTE
This writer rounded on patient.  Patient agreed to talk with this writer and was informed of counselor's role.    The pt was dressed in a green gown and appears documented age.  There is a safety sitter present. The patient's appearance is unkempt and shows poor hygiene.  The patient's behavior shows poor impulse control and is restless. The patient is disoriented.  The patient's speech is loud.  The patient's mood  is irritable.  The range of affect is labile.  The patient's thought content  demonstrates delusions.  The thought process shows a flight of ideas.  The patient's perception shows no evidence of impairment. The patient's memory is impaired.  The patient's appetite shows no evidence of impairment.  The patient's sleep shows no evidence of impairment. The patient shows no insight.  The patient's judgement is psychologically impaired.  Patient denies suicidal and/or homicidal ideation.   Pt states multiple times that she is ready to go home.  She states she will become more destructive the longer she remains in the ER. The plan is for continued D19 placement at this time.

## 2024-10-06 NOTE — BSMART NOTE
This writer attempted to round on pt, however pt laying on ER stretcher with eyes closed.  Will attempt at a later time

## 2024-10-06 NOTE — BSMART NOTE
This writer attempted to round on pt, however pt laying on ER stretcher with eyes closed. Will attempt at a later time.  Sitter at bedside states that pt has been sleeping for 'a while' and will notify this writer when pt awakens

## 2024-10-06 NOTE — ED NOTES
Progress Note on Active Behavioral Health or Case Management Hold    I received signout from Doctor Alford  on Rosario Hunt and have assumed care while this patient is in our Emergency Department. Please see the below progress notes from the prior providers and myself.    ED Course as of 10/06/24 1834   Wed Oct 02, 2024   1840 MDM: 74-year-old female presents for evaluation of suicidal ideation.  On exam, patient is very agitated, yelling at providers.  Patient arrives under E CO, and given concern for her acting on these thoughts, will have her evaluated by behavioral health services to determine if she meets inpatient crisis stabilization criteria.  Further differential including metabolic encephalopathy versus urinary tract infection.  Will evaluate with CBC, CMP, UDS, UA, ethanol level [BQ]   2030 Creatinine(!): 2.26  Creatinine at baseline [BQ]   2209 Evidence of UTI on UA.  Will treat with antibiotic.  Patient is medically clear [BQ]   Fri Oct 04, 2024   1206 EKG at 1117 normal sinus rhythm rate of 70.  Right bundle branch block.  No ST changes reason rule out dysrhythmia.  Interpreted independently by ER physician. [HP]   1340 Patient is under TDO for her suicidal thoughts.  However she is declining a CT head.  Patient is of sound mind to decline a CT head.  It was only done at the request of the outside facility as a dementia evaluation. [HP]   Sat Oct 05, 2024   1755 Pt received in signout, bedsearch ongoing. Geodon ordered but not given for agitation. [DM]   2127 Received signout.  Bed search in process.  TDO. [HP]   Sun Oct 06, 2024   1833 Received signout.  Bed search in process.  TDO.  Patient is currently being treated for a UTI without evidence of sepsis. [HP]      ED Course User Index  [BQ] Leander Coleman MD  [DM] Tanesha Shen MD  [HP] Catia Ulrich MD       Provider: Catia Ulrich MD   Note Started: 6:34 PM EDT 10/6/24       Catia Ulrich MD  10/06/24 1834

## 2024-10-06 NOTE — ED NOTES
Pt has been unremarkable during shift. Pt is resting comfortably and has been calm and cooperative during shift.

## 2024-10-07 VITALS
DIASTOLIC BLOOD PRESSURE: 59 MMHG | HEART RATE: 55 BPM | SYSTOLIC BLOOD PRESSURE: 130 MMHG | TEMPERATURE: 98.7 F | WEIGHT: 125 LBS | BODY MASS INDEX: 26.24 KG/M2 | OXYGEN SATURATION: 96 % | RESPIRATION RATE: 16 BRPM | HEIGHT: 58 IN

## 2024-10-07 PROCEDURE — 6370000000 HC RX 637 (ALT 250 FOR IP): Performed by: STUDENT IN AN ORGANIZED HEALTH CARE EDUCATION/TRAINING PROGRAM

## 2024-10-07 PROCEDURE — 6370000000 HC RX 637 (ALT 250 FOR IP): Performed by: PSYCHIATRY & NEUROLOGY

## 2024-10-07 RX ADMIN — HYDROXYZINE HYDROCHLORIDE 25 MG: 25 TABLET, FILM COATED ORAL at 07:49

## 2024-10-07 RX ADMIN — METOPROLOL SUCCINATE 100 MG: 50 TABLET, EXTENDED RELEASE ORAL at 07:47

## 2024-10-07 RX ADMIN — QUETIAPINE FUMARATE 12.5 MG: 25 TABLET ORAL at 07:59

## 2024-10-07 RX ADMIN — AMLODIPINE BESYLATE 5 MG: 5 TABLET ORAL at 07:48

## 2024-10-07 RX ADMIN — DIVALPROEX SODIUM 250 MG: 125 CAPSULE, COATED PELLETS ORAL at 11:05

## 2024-10-07 NOTE — BSMART NOTE
Per Soumya with D19, pt accepted by Dr Bustamanet to [facility].  Number for nurse report [phone].  Zenia ANDERSON aware.  [facility] to transport pt

## 2024-10-07 NOTE — ED PROVIDER NOTES
Progress Note on Active Behavioral Health or Case Management Hold    I received signout from Doctor Coleman  on [facility] Hunt and have assumed care while this patient is in our Emergency Department. Please see the below progress notes from the prior providers and myself.    ED Course as of 10/07/24 0725   Wed Oct 02, 2024   1840 MDM: 74-year-old female presents for evaluation of suicidal ideation.  On exam, patient is very agitated, yelling at providers.  Patient arrives under E CO, and given concern for her acting on these thoughts, will have her evaluated by behavioral health services to determine if she meets inpatient crisis stabilization criteria.  Further differential including metabolic encephalopathy versus urinary tract infection.  Will evaluate with CBC, CMP, UDS, UA, ethanol level [BQ]   2030 Creatinine(!): 2.26  Creatinine at baseline [BQ]   2209 Evidence of UTI on UA.  Will treat with antibiotic.  Patient is medically clear [BQ]   Fri Oct 04, 2024   1206 EKG at 1117 normal sinus rhythm rate of 70.  Right bundle branch block.  No ST changes reason rule out dysrhythmia.  Interpreted independently by ER physician. [HP]   1340 Patient is under TDO for her suicidal thoughts.  However she is declining a CT head.  Patient is of sound mind to decline a CT head.  It was only done at the request of the outside facility as a dementia evaluation. [HP]   Sat Oct 05, 2024   1755 Pt received in signout, bedsearch ongoing. Geodon ordered but not given for agitation. [DM]   2127 Received signout.  Bed search in process.  TDO. [HP]   Sun Oct 06, 2024   1833 Received signout.  Bed search in process.  TDO.  Patient is currently being treated for a UTI without evidence of sepsis. [HP]   2314 Patient received in signout, continues to await placement, under TDO, being treated for UTI [BQ]   Mon Oct 07, 2024   0724 Patient received as signout, under TDO, awaiting placement, currently being treated for UTI.  No acute issues at

## 2024-10-07 NOTE — BH NOTE
Patient is seen briefly progress note further October 6, 2024 spoke with the Flako Corie patient awaiting placement by [facility] her family [facility] remains delusional paranoid thinks she has got a house in the [facility] that she want to go home and says if she stays here she is going to act up medication reviewed and she is currently on Depakote sprinkles 125 mg twice a day increased to 250 twice a day increase the Seroquel from 12.5 mg at night to 12.5 mg twice a day is also on a as needed Geodon and Zyprexa can be easily agitated labile threatening and intimidating her vital signs today pulse 71 blood pressure 183 148/665 temperature 98.6 respirations 16 O2 saturation 96% thank you intake fair